# Patient Record
Sex: FEMALE | NOT HISPANIC OR LATINO | URBAN - METROPOLITAN AREA
[De-identification: names, ages, dates, MRNs, and addresses within clinical notes are randomized per-mention and may not be internally consistent; named-entity substitution may affect disease eponyms.]

---

## 2017-02-16 ENCOUNTER — HOSPITAL ENCOUNTER (OUTPATIENT)
Dept: MAMMOGRAPHY | Facility: CLINIC | Age: 57
Discharge: HOME | End: 2017-02-16
Attending: INTERNAL MEDICINE

## 2017-02-16 DIAGNOSIS — Z12.31 VISIT FOR SCREENING MAMMOGRAM: ICD-10-CM

## 2018-02-23 ENCOUNTER — OFFICE VISIT - HEALTHEAST (OUTPATIENT)
Dept: INTERNAL MEDICINE | Facility: CLINIC | Age: 58
End: 2018-02-23

## 2018-02-23 ENCOUNTER — HOSPITAL ENCOUNTER (OUTPATIENT)
Dept: MAMMOGRAPHY | Facility: CLINIC | Age: 58
Discharge: HOME | End: 2018-02-23
Attending: INTERNAL MEDICINE

## 2018-02-23 DIAGNOSIS — N95.9 MENOPAUSAL AND POSTMENOPAUSAL DISORDER: ICD-10-CM

## 2018-02-23 DIAGNOSIS — J30.1 HAY FEVER: ICD-10-CM

## 2018-02-23 DIAGNOSIS — Z00.00 ANNUAL PHYSICAL EXAM: ICD-10-CM

## 2018-02-23 DIAGNOSIS — Z12.31 VISIT FOR SCREENING MAMMOGRAM: ICD-10-CM

## 2018-02-23 DIAGNOSIS — N39.3 STRESS INCONTINENCE: ICD-10-CM

## 2018-02-23 LAB
CHOLEST SERPL-MCNC: 180 MG/DL
FASTING STATUS PATIENT QL REPORTED: YES
FASTING STATUS PATIENT QL REPORTED: YES
GLUCOSE BLD-MCNC: 94 MG/DL (ref 70–125)
HDLC SERPL-MCNC: 52 MG/DL
LDLC SERPL CALC-MCNC: 113 MG/DL
TRIGL SERPL-MCNC: 77 MG/DL

## 2018-02-23 ASSESSMENT — MIFFLIN-ST. JEOR: SCORE: 1273.49

## 2018-02-25 LAB
25(OH)D3 SERPL-MCNC: 29.3 NG/ML (ref 30–80)
25(OH)D3 SERPL-MCNC: 29.3 NG/ML (ref 30–80)

## 2018-02-26 ENCOUNTER — AMBULATORY - HEALTHEAST (OUTPATIENT)
Dept: INTERNAL MEDICINE | Facility: CLINIC | Age: 58
End: 2018-02-26

## 2018-02-26 DIAGNOSIS — E55.9 VITAMIN D DEFICIENCY: ICD-10-CM

## 2018-02-27 ENCOUNTER — COMMUNICATION - HEALTHEAST (OUTPATIENT)
Dept: INTERNAL MEDICINE | Facility: CLINIC | Age: 58
End: 2018-02-27

## 2018-02-27 DIAGNOSIS — E55.9 VITAMIN D DEFICIENCY: ICD-10-CM

## 2018-02-27 RX ORDER — ERGOCALCIFEROL 1.25 MG/1
50000 CAPSULE ORAL
Qty: 8 CAPSULE | Refills: 0 | Status: SHIPPED | OUTPATIENT
Start: 2018-02-27

## 2018-02-28 ENCOUNTER — COMMUNICATION - HEALTHEAST (OUTPATIENT)
Dept: INTERNAL MEDICINE | Facility: CLINIC | Age: 58
End: 2018-02-28

## 2018-02-28 DIAGNOSIS — E55.9 VITAMIN D DEFICIENCY: ICD-10-CM

## 2018-05-11 ENCOUNTER — OFFICE VISIT - HEALTHEAST (OUTPATIENT)
Dept: INTERNAL MEDICINE | Facility: CLINIC | Age: 58
End: 2018-05-11

## 2018-05-11 DIAGNOSIS — D17.9 LIPOMA: ICD-10-CM

## 2018-05-11 DIAGNOSIS — E55.9 VITAMIN D DEFICIENCY: ICD-10-CM

## 2018-05-11 DIAGNOSIS — S69.91XA FINGER INJURY, RIGHT, INITIAL ENCOUNTER: ICD-10-CM

## 2018-05-11 LAB
25(OH)D3 SERPL-MCNC: 41.3 NG/ML (ref 30–80)
25(OH)D3 SERPL-MCNC: 41.3 NG/ML (ref 30–80)

## 2019-01-29 ENCOUNTER — COMMUNICATION - HEALTHEAST (OUTPATIENT)
Dept: FAMILY MEDICINE | Facility: CLINIC | Age: 59
End: 2019-01-29

## 2019-01-29 DIAGNOSIS — Z12.31 VISIT FOR SCREENING MAMMOGRAM: ICD-10-CM

## 2019-02-25 ENCOUNTER — HOSPITAL ENCOUNTER (OUTPATIENT)
Dept: MAMMOGRAPHY | Facility: CLINIC | Age: 59
Discharge: HOME | End: 2019-02-25
Attending: INTERNAL MEDICINE

## 2019-02-25 DIAGNOSIS — Z12.31 VISIT FOR SCREENING MAMMOGRAM: ICD-10-CM

## 2021-05-26 ENCOUNTER — RECORDS - HEALTHEAST (OUTPATIENT)
Dept: ADMINISTRATIVE | Facility: CLINIC | Age: 61
End: 2021-05-26

## 2021-05-29 ENCOUNTER — RECORDS - HEALTHEAST (OUTPATIENT)
Dept: ADMINISTRATIVE | Facility: CLINIC | Age: 61
End: 2021-05-29

## 2021-05-30 ENCOUNTER — RECORDS - HEALTHEAST (OUTPATIENT)
Dept: ADMINISTRATIVE | Facility: CLINIC | Age: 61
End: 2021-05-30

## 2021-06-01 VITALS — BODY MASS INDEX: 25.96 KG/M2 | WEIGHT: 156 LBS

## 2021-06-01 VITALS — BODY MASS INDEX: 25.99 KG/M2 | WEIGHT: 156 LBS | HEIGHT: 65 IN

## 2021-06-16 PROBLEM — J30.1 HAY FEVER: Status: ACTIVE | Noted: 2018-02-23

## 2021-06-16 PROBLEM — N39.3 STRESS INCONTINENCE: Status: ACTIVE | Noted: 2018-02-23

## 2021-06-16 NOTE — PROGRESS NOTES
FEMALE PREVENTATIVE EXAM    Assessment and Plan:     1. Annual physical exam  Vitamin D, Total (25-Hydroxy)    Lipid Callahan FASTING; recommended continuing regular physical activity, adding calcium 1200 mg per day and 400-800 international units of vitamin D.  Continue regular sunscreen usage during the summertime.  She is going to see her dermatologist for her annual skin check due to have an have Mohs surgery on her face for lesion before.    Glucose-we will send results via [employer] per patient request   2. Hay fever   continue symptomatic treatment   3. Menopausal Disorder   we discussed vaginal dryness.  Continue to use lubricant as tolerated.  Unfortunately given her history of pulmonary embolism would not be a candidate for estrogen cream; she is agreeable to this.   4. Stress incontinence   she is wearing a liner every day now; 95% of the time she is not having any problems.  Encouraged her to do Kegel exercises 3-4 times a day.  If she wants to see a pelvic  for more advanced pelvic floor therapy she will let me know and I can make a referral.  She will check with her network/insurance.   5.   Health maintenance  she is up-to-date with her mammogram which she just got done this morning with results pending, Pap smear is due in 2019, colonoscopy due in 2020.  Her immunizations are up-to-date        Next follow up:  Return in about 1 year (around 2/23/2019) for Annual physical.    Immunization Review  Adult Imm Review: No immunizations due today    I discussed the following with the patient:   Adult Healthy Living: Importance of regular exercise  Healthy nutrition  Supplement use calcium and vitamin D as above    No advance care directive    Subjective:   Chief Complaint: Candida Martinez is an 58 y.o. female here for a preventative health visit.     HPI: She has no specific complaints today other than she has some vaginal dryness/some slight pain with sexual intercourse.  She and her   use lubricants.  She has had history of pulmonary embolism so is not a candidate for estrogen cream.  She also complains of stress incontinence when she laughs or coughs hard.  Denies any dysuria, hematuria, urinary frequency or urgency.  No abdominal pain.  No back pain.  No fevers or chills.    Healthy Habits  Are you taking a daily aspirin? No  Do you typically excersising at least 40 min, 3-4 times per week?  Yes  Do you usually eat at least 4 servings of fruit and vegetables a day, include whole grains and fiber and avoid regularly eating high fat foods? Yes  Have you had an eye exam in the past two years? Yes  Do you see a dentist twice per year? Yes  Do you have any concerns regarding sleep? No    Safety Screen  If you own firearms, are they secured in a locked gun cabinet or with trigger locks? NO  No Data Recorded    Review of Systems:  Please see above.  The rest of the review of systems are negative for all systems.     Pap History:   No - age 30-65 PAP every 5 years recommended  Cancer Screening       Status Date      MAMMOGRAM Next Due 2/16/2019      Done 2/16/2017 MAMMO SCREENING BILATERAL     Patient has more history with this topic...    PAP SMEAR Next Due 10/22/2019      Done 10/22/2014 GYNECOLOGIC CYTOLOGY (PAP SMEAR)    COLONOSCOPY Next Due 12/17/2020      Done 12/17/2010 ENDOSCOPY, COLON          Patient Care Team:  Nando Schmidt MD as PCP - General        History     Reviewed By Date/Time Sections Reviewed    Parul Pearl CMA 2/23/2018  8:11 AM Tobacco            Objective:   Vital Signs:   Visit Vitals     LMP 09/30/2015          PHYSICAL EXAM  Gen: alert and oriented X3. Looks in No distress  HEENT: PERLLA; EOMI. O/P clear and No erythema. Nose clear with no drainage.   Neck: supple with no LAD. No thyromegaly. No carotid bruits.  Lungs: Yes normal breath sounds bilaterally without wheezing or rhonchi  CV: RRR without murmur or gallop. Normal pulses. CRT <2 sec  Abd: soft and Normal BS.  No tenderness; No distention. No organomegaly.   : Did not examine today  Ext: without edema; without trauma.   Neuro: CN 2-12 Normal and DTRs are  symmetric; Normal strength  Skin: No rash: description: Not applicable. Warm and dry.   Psych: normal        The 10-year ASCVD risk score (Jo RADHA Jr, et al., 2013) is: 2%    Values used to calculate the score:      Age: 58 years      Sex: Female      Is Non- : No      Diabetic: No      Tobacco smoker: No      Systolic Blood Pressure: 116 mmHg      Is BP treated: No      HDL Cholesterol: 53 mg/dL      Total Cholesterol: 171 mg/dL       Medication List      Notice  As of 2/23/2018  8:56 AM    You have not been prescribed any medications.          Additional Screenings Completed Today:   Lipid panel, glucose and vitamin D.  Belinda Hart MD

## 2021-06-23 NOTE — TELEPHONE ENCOUNTER
Referral Request  Type of referral:   mammogram  Who s requesting: Patient  Why the request: Patient has new [facility] insurance  Have you been seen for this request: N/A  Does patient have a preference on a group/provider?  Nilesh Lozanoay to leave a detailed message?  Yes  [phone]

## 2021-06-26 ENCOUNTER — HEALTH MAINTENANCE LETTER (OUTPATIENT)
Age: 61
End: 2021-06-26

## 2021-06-26 NOTE — PROGRESS NOTES
Progress Notes by Belinda Hart MD at 5/11/2018  9:40 AM     Author: Belinda Hart MD Service: -- Author Type: Physician    Filed: 5/11/2018 10:00 AM Encounter Date: 5/11/2018 Status: Signed    : Belinda Hart MD (Physician)         ASSESSMENT:  1. Vitamin D deficiency; status post high-dose vitamin D replacement for 3 months and now on over-the-counter vitamin D supplementation  - Vitamin D, Total (25-Hydroxy) level today.  We will send her results via my chart.  Continue with vitamin D supplementation of 400-800 IUs per day.    2. Finger injury, right, initial encounter  Most likely a tendon injury but heels now.  No pain.  Discussed possibility of need for x-ray but at this point is probably not going to help with management.  She agrees to leave it alone and watch for now.    3. Lipoma  Discussed the potential for growth, discomfort and if she should have growth or pain we can potentially remove it.  Discussed with her that it could really recur even if we remove it.  For now since she has no pain and its relatively small I recommended that she would just leave it alone and she agrees with this plan for now.  She feels very reassured.  Information given to the patient.      PLAN:  Patient Instructions   Dr. Hart will let you know the results.       Lipoma, No Treatment  A lipoma is a local overgrowth of fatty tissue. It appears as a soft raised area, usually less than 2 inches across. It is a benign condition (not cancer).   Home care  General information regarding lipoma includes:  1. No special care is needed for a lipoma.  2. You can consider removal for cosmetic reasons.  3. Sometimes lipomas are uncomfortable because they put pressure on surrounding tissues. This is also a reason to have a lipoma removed.  Follow-up care  Follow up with your healthcare provider, or as advised if you want to have the lipoma removed at a later time.  When to seek medical advice  Call your healthcare provider right  away if any of the following occur:    Redness, pain, tenderness, or drainage from the lipoma    Lipoma begins to enlarge or change shape    Changes in the color of the skin over the lipoma  Date Last Reviewed: 6/1/2016 2000-2017 [employer]. [address] [place] [id number]. All rights reserved. This information is not intended as a substitute for professional medical care. Always follow your healthcare professional's instructions.            No orders of the defined types were placed in this encounter.    There are no discontinued medications.    Follow-up in 8-10 months for wellness visit.    CHIEF COMPLAINT:  Chief Complaint   Patient presents with   ? Mass     lower, right back       HISTORY OF PRESENT ILLNESS:  Candida is a 58 y.o. female presenting to the clinic today for mass on right lower flank. Apparently her previous doctor has noticed it before. She notices it when she puts lotion on it and no pain. No pain when she lays on it. Unsure if it is getting bigger or not.  She has no other lesions elsewhere.  She also had a volleyball injury to her right fifth finger about 3 weeks ago.  At the time she had some swelling but no significant amount of pain at all even then.  Her neighbor who is a radiologist examine her and thought it was probably not broken due to the fact that she had no pain with palpation of her joint.  Now her right fifth finger distal joint is a little bit more enlarged and she is just concerned about development of arthritis given her mother has osteoarthritis.  She has no pain and has full function of her finger.  She also has history of vitamin D deficiency and had been on high-dose vitamin D supplementation.  She is now on over-the-counter supplementation would like vitamin D level rechecked.    REVIEW OF SYSTEMS:   11 system ROS were negative except as noted in HPI.     PFSH:  History   Smoking Status   ? Never Smoker   Smokeless Tobacco   ? Never Used       No  family history on file.    Social History     Social History   ? Marital status:      Spouse name: N/A   ? Number of children: N/A   ? Years of education: N/A     Occupational History   ?        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 Smoking status: Never Smoker   ? Smokeless tobacco: Never Used   ? Alcohol use 1.2 oz/week     2 Cans of beer per week      Comment: perweek   ? Drug use: No   ? Sexual activity: Yes      Comment: dryness      Other Topics Concern   ? Not on file     Social History Narrative    [employer]; lives with  and goes to [facility].        No past surgical history on file.    Allergies   Allergen Reactions   ? Codeine Nausea Only       Active Ambulatory Problems     Diagnosis Date Noted   ? Menopausal Disorder    ? Insomnia    ? Hay fever 02/23/2018   ? Stress incontinence 02/23/2018     Resolved Ambulatory Problems     Diagnosis Date Noted   ? Pulmonary Embolism      Past Medical History:   Diagnosis Date   ? Pulmonary embolism    ? Skin cancer 2012       VITALS:  Vitals:    05/11/18 0930   BP: 118/60   Pulse: 76   SpO2: 98%   Weight: 156 lb (70.8 kg)     Wt Readings from Last 3 Encounters:   05/11/18 156 lb (70.8 kg)   02/23/18 156 lb (70.8 kg)   10/12/16 146 lb 3.2 oz (66.3 kg)     Body mass index is 25.96 kg/(m^2).    PHYSICAL EXAM:  Constitutional:  In no distress.  Vitals:  Per nursing notes.  Ears: TMs and canals clear bilaterally.  Oropharynx:  Without posterior nasal drainage or thrush.  Neck:  Supple, thyroid not palpable and no LAD.  Cardiac:  Regular rate and rhythm without murmurs, rubs, or gallops. Carotids without bruits. Legs without edema. Palpation of the radial pulse regular.  Lungs: Clear bilaterally without wheezing or rales.  Respiratory effort normal.  Abdomen:   Bowel sounds positive, nontender, nondistended.  Neither liver nor spleen palpable. No masses.   Thorax and lumbar: In the right lateral lumbar area  around L2 there is a 1-1-1/2 cm lipoma noted not very well circumflex circumscribed.  No tenderness to palpation.  Skin:   Without rash, bruise, or palpable lesions.  Rheumatologic: Normal joints and nails of the hands; right fifth distal joint is slightly swollen but no tenderness to palpation.  Full range of motion.  Neurologic:  Cranial nerves II-XII intact. MS intact     Psychiatric:  Mood appropriate, memory intact.     MEDICATIONS:  Current Outpatient Prescriptions   Medication Sig Dispense Refill   ? ergocalciferol (VITAMIN D2) 50,000 unit capsule Take 1 capsule (50,000 Units total) by mouth every 7 days. (Patient not taking: Reported on 5/11/2018) 8 capsule 0     No current facility-administered medications for this visit.        Belinda Hart MD

## 2021-07-13 ENCOUNTER — RECORDS - HEALTHEAST (OUTPATIENT)
Dept: ADMINISTRATIVE | Facility: CLINIC | Age: 61
End: 2021-07-13

## 2021-07-21 ENCOUNTER — RECORDS - HEALTHEAST (OUTPATIENT)
Dept: ADMINISTRATIVE | Facility: CLINIC | Age: 61
End: 2021-07-21

## 2021-10-16 ENCOUNTER — HEALTH MAINTENANCE LETTER (OUTPATIENT)
Age: 61
End: 2021-10-16

## 2022-07-23 ENCOUNTER — HEALTH MAINTENANCE LETTER (OUTPATIENT)
Age: 62
End: 2022-07-23

## 2022-10-01 ENCOUNTER — HEALTH MAINTENANCE LETTER (OUTPATIENT)
Age: 62
End: 2022-10-01

## 2023-03-21 ENCOUNTER — TRANSFERRED RECORDS (OUTPATIENT)
Dept: HEALTH INFORMATION MANAGEMENT | Facility: CLINIC | Age: 63
End: 2023-03-21

## 2023-08-06 ENCOUNTER — HEALTH MAINTENANCE LETTER (OUTPATIENT)
Age: 63
End: 2023-08-06